# Patient Record
Sex: MALE | Race: BLACK OR AFRICAN AMERICAN | Employment: OTHER | URBAN - METROPOLITAN AREA
[De-identification: names, ages, dates, MRNs, and addresses within clinical notes are randomized per-mention and may not be internally consistent; named-entity substitution may affect disease eponyms.]

---

## 2017-08-01 ENCOUNTER — HOSPICE ADMISSION (OUTPATIENT)
Dept: HOSPICE | Age: 77
End: 2017-08-01
Payer: OTHER MISCELLANEOUS

## 2017-08-01 ENCOUNTER — HOSPICE ADMISSION (OUTPATIENT)
Dept: HOSPICE | Facility: HOSPICE | Age: 77
End: 2017-08-01

## 2017-08-02 ENCOUNTER — HOSPITAL ENCOUNTER (INPATIENT)
Age: 77
LOS: 5 days | Discharge: HOSPICE | End: 2017-08-07
Attending: INTERNAL MEDICINE | Admitting: INTERNAL MEDICINE

## 2017-08-02 PROCEDURE — 74011250637 HC RX REV CODE- 250/637: Performed by: INTERNAL MEDICINE

## 2017-08-02 PROCEDURE — 0655 HSPC INPATIENT RESPITE

## 2017-08-02 PROCEDURE — 3336500001 HSPC ELECTION

## 2017-08-02 RX ORDER — MILRINONE LACTATE 0.2 MG/ML
0.38 INJECTION, SOLUTION INTRAVENOUS CONTINUOUS
Status: DISCONTINUED | OUTPATIENT
Start: 2017-08-04 | End: 2017-08-07 | Stop reason: HOSPADM

## 2017-08-02 RX ORDER — AMIODARONE HYDROCHLORIDE 200 MG/1
200 TABLET ORAL DAILY
Status: DISCONTINUED | OUTPATIENT
Start: 2017-08-03 | End: 2017-08-07 | Stop reason: HOSPADM

## 2017-08-02 RX ORDER — LISINOPRIL 5 MG/1
5 TABLET ORAL DAILY
Status: DISCONTINUED | OUTPATIENT
Start: 2017-08-03 | End: 2017-08-07 | Stop reason: HOSPADM

## 2017-08-02 RX ORDER — POTASSIUM CHLORIDE 20 MEQ/1
40 TABLET, EXTENDED RELEASE ORAL DAILY
Status: DISCONTINUED | OUTPATIENT
Start: 2017-08-03 | End: 2017-08-07 | Stop reason: HOSPADM

## 2017-08-02 RX ORDER — MILRINONE LACTATE 0.2 MG/ML
0.38 INJECTION, SOLUTION INTRAVENOUS CONTINUOUS
Status: DISCONTINUED | OUTPATIENT
Start: 2017-08-02 | End: 2017-08-02

## 2017-08-02 RX ORDER — CLOPIDOGREL BISULFATE 75 MG/1
75 TABLET ORAL DAILY
Status: DISCONTINUED | OUTPATIENT
Start: 2017-08-03 | End: 2017-08-07 | Stop reason: HOSPADM

## 2017-08-02 RX ORDER — ATORVASTATIN CALCIUM 40 MG/1
40 TABLET, FILM COATED ORAL
Status: DISCONTINUED | OUTPATIENT
Start: 2017-08-02 | End: 2017-08-07 | Stop reason: HOSPADM

## 2017-08-02 RX ORDER — BUPROPION HYDROCHLORIDE 100 MG/1
100 TABLET ORAL
Status: DISCONTINUED | OUTPATIENT
Start: 2017-08-02 | End: 2017-08-07 | Stop reason: HOSPADM

## 2017-08-02 RX ORDER — FACIAL-BODY WIPES
10 EACH TOPICAL DAILY PRN
Status: DISCONTINUED | OUTPATIENT
Start: 2017-08-02 | End: 2017-08-07 | Stop reason: HOSPADM

## 2017-08-02 RX ORDER — SILVER SULFADIAZINE 10 G/1000G
CREAM TOPICAL
Status: DISCONTINUED | OUTPATIENT
Start: 2017-08-02 | End: 2017-08-04

## 2017-08-02 RX ORDER — HYOSCYAMINE SULFATE 0.12 MG/1
0.12 TABLET SUBLINGUAL
Status: DISCONTINUED | OUTPATIENT
Start: 2017-08-02 | End: 2017-08-02 | Stop reason: SDUPTHER

## 2017-08-02 RX ORDER — LORAZEPAM 2 MG/ML
0.5 CONCENTRATE ORAL
Status: DISCONTINUED | OUTPATIENT
Start: 2017-08-02 | End: 2017-08-07 | Stop reason: HOSPADM

## 2017-08-02 RX ORDER — CARVEDILOL 6.25 MG/1
6.25 TABLET ORAL 2 TIMES DAILY WITH MEALS
Status: DISCONTINUED | OUTPATIENT
Start: 2017-08-02 | End: 2017-08-07 | Stop reason: HOSPADM

## 2017-08-02 RX ORDER — HYOSCYAMINE SULFATE 0.12 MG/ML
125 LIQUID ORAL
Status: DISCONTINUED | OUTPATIENT
Start: 2017-08-02 | End: 2017-08-07 | Stop reason: HOSPADM

## 2017-08-02 RX ORDER — BUMETANIDE 1 MG/1
1 TABLET ORAL 2 TIMES DAILY
Status: DISCONTINUED | OUTPATIENT
Start: 2017-08-02 | End: 2017-08-07 | Stop reason: HOSPADM

## 2017-08-02 RX ORDER — HYOSCYAMINE SULFATE 0.12 MG/ML
250 LIQUID ORAL
Status: DISCONTINUED | OUTPATIENT
Start: 2017-08-02 | End: 2017-08-07 | Stop reason: HOSPADM

## 2017-08-02 RX ORDER — METFORMIN HYDROCHLORIDE 500 MG/1
500 TABLET ORAL 2 TIMES DAILY WITH MEALS
Status: DISCONTINUED | OUTPATIENT
Start: 2017-08-02 | End: 2017-08-02 | Stop reason: ALTCHOICE

## 2017-08-02 RX ORDER — MORPHINE SULFATE 20 MG/ML
5 SOLUTION ORAL
Status: DISCONTINUED | OUTPATIENT
Start: 2017-08-02 | End: 2017-08-07 | Stop reason: HOSPADM

## 2017-08-02 RX ORDER — ACETAMINOPHEN 650 MG/1
650 SUPPOSITORY RECTAL
Status: DISCONTINUED | OUTPATIENT
Start: 2017-08-02 | End: 2017-08-07 | Stop reason: HOSPADM

## 2017-08-02 RX ORDER — PANTOPRAZOLE SODIUM 40 MG/1
40 TABLET, DELAYED RELEASE ORAL
Status: DISCONTINUED | OUTPATIENT
Start: 2017-08-03 | End: 2017-08-07 | Stop reason: HOSPADM

## 2017-08-02 RX ORDER — SILVER SULFADIAZINE 10 G/1000G
CREAM TOPICAL
Status: DISCONTINUED | OUTPATIENT
Start: 2017-08-02 | End: 2017-08-02

## 2017-08-02 RX ORDER — HYDRALAZINE HYDROCHLORIDE 50 MG/1
50 TABLET, FILM COATED ORAL 3 TIMES DAILY
Status: DISCONTINUED | OUTPATIENT
Start: 2017-08-02 | End: 2017-08-07 | Stop reason: HOSPADM

## 2017-08-02 RX ORDER — HALOPERIDOL 2 MG/ML
1 SOLUTION ORAL
Status: DISCONTINUED | OUTPATIENT
Start: 2017-08-02 | End: 2017-08-07 | Stop reason: HOSPADM

## 2017-08-02 RX ORDER — INSULIN GLARGINE 100 [IU]/ML
14 INJECTION, SOLUTION SUBCUTANEOUS 2 TIMES DAILY
Status: DISCONTINUED | OUTPATIENT
Start: 2017-08-02 | End: 2017-08-07 | Stop reason: HOSPADM

## 2017-08-02 RX ADMIN — ATORVASTATIN CALCIUM 40 MG: 40 TABLET, FILM COATED ORAL at 21:38

## 2017-08-02 RX ADMIN — HYDRALAZINE HYDROCHLORIDE 50 MG: 50 TABLET, FILM COATED ORAL at 15:34

## 2017-08-02 RX ADMIN — CARVEDILOL 6.25 MG: 6.25 TABLET ORAL at 20:07

## 2017-08-02 RX ADMIN — BUPROPION HYDROCHLORIDE 100 MG: 100 TABLET ORAL at 21:38

## 2017-08-02 RX ADMIN — INSULIN GLARGINE 14 UNITS: 100 INJECTION, SOLUTION SUBCUTANEOUS at 20:05

## 2017-08-02 RX ADMIN — BUMETANIDE 1 MG: 1 TABLET ORAL at 20:07

## 2017-08-02 RX ADMIN — HYDRALAZINE HYDROCHLORIDE 50 MG: 50 TABLET, FILM COATED ORAL at 20:08

## 2017-08-02 NOTE — PROGRESS NOTES
1400 Patient sitting up in bed watching television. 1600 Patient sitting up in bed talking on the telephone. Patient denying pain discomfort. 1800 Patient consuming dinner.

## 2017-08-02 NOTE — PROGRESS NOTES
NAME OF PATIENT:  Anna Mendez    LEVEL OF CARE:  Respite    REASON FOR GIP:   n/a    *PATIENT REMAINS ELIGIBLE FOR GIP LEVEL OF CARE AS EVIDENCED BY: (MUST BE ADDRESSED OF PATIENT GIP)      REASON FOR RESPITE:  Caregiver breakdown    O2 SAFETY:  room air    FALL INTERVENTIONS PROVIDED:   Implemented/recommended use of non-skid footwear, Implemented/recommended use of fall risk identification flag to all team members, Implemented/recommended assistive devices and encouraged their use, Implemented/recommended resources for alarm system (personal alarm, bed alarm, call bell, etc.) , Implemented/recommended environmental changes (remove hazards, lower bed, improve lighting, etc.) and Implemented/recommended increased supervision/assistance    INTERDISPLINARY COMMUNICATION/COLLABORATION:  Physician, MSW, [place] and RN, CNA    NEW MEDICATION INITIATION DOCUMENTATION:  Documentation completed in Clinical Note in [facility]    Reason medication is being initiated:  n/a    MD / Provider name consulted re: change in status / initiation of new medication:  n/a    New Symptom(s):  n/a    New Order(s):  n/a    Name of the person notified of the changes:  n/a    Name of person being taught:  n/a    Instructions given:  n/a    Side Effects taught:  n/a    Response to teaching:  n/a      COMFORTABLE DYING MEASURE:  Is Patient/family satisfied with symptom level?  yes    DISCHARGE PLAN:  Discharge home after respite stay. 1213: Patient arrived via [facility] to room 13 from home. Patient is under [facility] for respite level of care with a dx of CHF. Patient is AOx3 and very pleasant. Wife is at the bedside. Patient lungs are clear. Milrinone gtt infusing and is being managed by [facility] and [facility]. Last BM was 8-1-17. Patient walks with walker due to recent falls. Noted scattered bruising and sores on arms, legs due to eczema. Tolerates a reg dieta and is continent of bowel and bladder.  PICC line upper arm.

## 2017-08-02 NOTE — PROGRESS NOTES
1900: Shift report received from Landen montague RN.  2000: Pt in bed, alert and oriented, talking on the phone. Pt reports no pain, no discomfort and answers all questions appropriately. BP elevated at 180/80, other VS stable. Pts blood glucose is 126. He is being medicated with scheduled meds and injected with 14 units of Lantus per order. Pt states his wound care on his legs was done this morning before admission by his home nurse and is due next on Friday. Pt asks for a cookie and he was given a chocolate chip cookies that the volunteers had brought in for the patients and families today. He states he eats like that at home as well. 2150: Pt in bed, waiting to be medicated with scheduled meds. Requests to ambulate with walker. RN asked pt if he is able to walk with assistance of walker without risk of falling and pt states he would be fine. Pt ambulated to family room, accompanied by RN. RN heats up food for pt upon request. He sits in chair and eats in, then ambulates back to his room. Pt then proceeds to walk in the bathroom to brush his teeth, RN attempts to grab a chair for pt to sit at the sink. Pt does not verbalize any issues but then falls backwards on his bottom and slightly graces door with his head. RN helps pt up and he has no injuries from the fall and reports no significant pain. Pt states the reason for the fall is loss in stability/strenghts in his feet and that this was something that happens to him intermittently, about a few times a week. He did not mention this prior to ambulating. RN is doing a [employer] report. 0320: Pt in bed, calm and awake, no complains. Bed alarm on.  0330: Pt calls to be assisted to the bathroom. 0400: Pt wants to brush his teeth, he is doing so sitting on the side of the bed. Pts wounds have saturated the socks, so dressing was assessed and determined that it needed to be changed. Extensive wounds care done to both legs.  Pt has multiple wounds of different sizes on his feet/toes up to his knee. Wound cleaned with wound cleanser. Pts verito paste applied, dressed with gauze and bandaged. Pt tolerated this well. 0730: [facility] hosp[facility] and pts wife to report pts fall. Post Fall Documentation      Benjamin Caba witnessed fall occurred on 8-2-17 (Date) at 2215 (Time). The answers to the following questions summarize the fall:     · In the patient's own words,:  · What was he/she doing when he/she fell? Waiting to ambulate to the bathroom    · What are his/her complaints? Lost strength in feet    · Nurse:  · Document observation, treatment, conversation, follow-up, and patient response. · Pt feel backwards, landing on his bottom, fall happened somewhat slow, so impact was not that great. Pt lightly bumped in to the door with his head. · What was the patient's condition when found (i.e., pain, symptoms, cuts, bruises)? No symptoms, no pain reported by patient    · What specific complaints did the patient have? None    · What did the staff do when patient was found (i.e., vital signs, returned to bed with fall alarm, side rails up)? Helped pt stand up, assessed skin for any injuries, asked pt if he was hurting anywhere, assisted him to lay down, 2 side rails up, bed alarm on, call button in reach, reminded to call when wanting to ambulate. · Which physician was notified? No Physician called because pt displays/states no symptoms or injuries, MD will be notified via this note and on day shift by staff.     Dulce Espinal

## 2017-08-02 NOTE — H&P
[facility]  [phone]    Patient Name: Nikita Echeverria  YOB: 1940    Date of Provider Hospice Visit: 08/02/17    Level of Care:   [] General Inpatient (GIP)    [] Routine   [x] Respite    Location of Care:  [] [facility] [place] [] [facility] [] [facility] [] [facility] [x] [facility]    Date of Original Hospice Admission: [facility] Director for Inpatient Care: Todd    Principle Hospice Diagnosis: end stage heart disease, heart failure        HOSPICE SUMMARY       Nikita Echeverria is a 76y.o. year old who was admitted to [facility] for respite care for caregiver breakdown, spouse is exhausted providing care. Hx of recent falls, weakness, debility. Pt is on Milrinone for end stage cardiac disease. Functionally, the patient's Karnofsky and/or Palliative Performance Scale has declined over a period of weeks and is estimated at 40%. The patient is dependent on the following ADLs:         The patient/family chose comfort measures with the support of Hospice. HOSPICE DIAGNOSES   Active Symptoms:  1. Weakness  2. Shortness of breath  3. Lower extremity wounds  4. History of falls     PLAN   Admitted for respite care, lorazepam, Levsin and morphine as needed  1.  and SW to support family needs  2. Disposition: home after respite  3. meds as per [facility]  4. Continue milrinone drip as per [facility]    Prognosis estimated based on 08/02/17 clinical assessment is:   [] Hours to Days    [x] Days to Weeks    [] Other:    Communicated plan of care with: Hospice Case Manager; Hospice IDT; Care Team     GOALS OF CARE     Resuscitation Status: DNR  Durable DNR: [x] Yes [] No    No flowsheet data found.      HISTORY     History obtained from: chart, SN and pt    CHIEF COMPLAINT: weakness  The patient is:   [x] Verbal  [] Nonverbal  [] Unresponsive    HPI/SUBJECTIVE:  76year old male with hx of end stage heart failure       REVIEW OF SYSTEMS     The following systems were: [x] reviewed  [] unable to be reviewed    Positive ROS include:  Constitutional:  Ears/nose/mouth/throat:  Respiratory:  Gastrointestinal:  Musculoskeletal:  Neurologic:  Psychiatric:  Endocrine:     Adult Non-Verbal Pain Assessment Score: denies    Face  [x] 0   No particular expression or smile  [] 1   Occasional grimace, tearing, frowning, wrinkled forehead  [] 2   Frequent grimace, tearing, frowning, wrinkled forehead    Activity (movement)  [x] 0   Lying quietly, normal position  [] 1   Seeking attention through movement or slow, cautious movement  [] 2   Restless, excessive activity and/or withdrawal reflexes    Guarding  [x] 0   Lying quietly, no positioning of hands over areas of body  [] 1   Splinting areas of the body, tense  [] 2   Rigid, stiff    Physiology (vital signs)  [x] 0   Stable vital signs  [] 1   Change in any of the following: SBP > 20mm Hg; HR > 20/minute  [] 2   Change in any of the following: SBP > 30mm Hg; HR > 25/minute    Respiratory  [x] 0   Baseline RR/SpO2, compliant with ventilator  [] 1   RR > 10 above baseline, or 5% drop SpO2, mild asynchrony with ventilator  [] 2   RR > 20 above baseline, or 10% drop SpO2, asynchrony with ventilator     FUNCTIONAL ASSESSMENT     Palliative Performance Scale (PPS):      PSYCHOSOCIAL/SPIRITUAL ASSESSMENT     Active Problems:    * No active hospital problems. *    No past medical history on file. No past surgical history on file. Social History   Substance Use Topics    Smoking status: Not on file    Smokeless tobacco: Not on file    Alcohol use Not on file     No family history on file.    No Known Allergies   Current Facility-Administered Medications   Medication Dose Route Frequency    morphine (ROXANOL) 100 mg/5 mL (20 mg/mL) concentrated solution 5 mg  5 mg SubLINGual Q4H PRN    LORazepam (INTENSOL) 2 mg/mL oral concentrate 0.5 mg  0.5 mg SubLINGual Q4H PRN    haloperidol (HALDOL) 2 mg/mL oral solution 1 mg  1 mg Oral Q6H PRN    bisacodyl (DULCOLAX) suppository 10 mg  10 mg Rectal DAILY PRN    acetaminophen (TYLENOL) suppository 650 mg  650 mg Rectal Q4H PRN    apixaban (ELIQUIS) tablet 5 mg  5 mg Oral BID    [START ON 8/3/2017] clopidogrel (PLAVIX) tablet 75 mg  75 mg Oral DAILY    [START ON 8/3/2017] lisinopril (PRINIVIL, ZESTRIL) tablet 5 mg  5 mg Oral DAILY    carvedilol (COREG) tablet 6.25 mg  6.25 mg Oral BID WITH MEALS    [START ON 8/3/2017] potassium chloride (K-DUR, KLOR-CON) SR tablet 40 mEq  40 mEq Oral DAILY    [START ON 8/3/2017] amiodarone (CORDARONE) tablet 200 mg  200 mg Oral DAILY    atorvastatin (LIPITOR) tablet 40 mg  40 mg Oral QHS    hydrALAZINE (APRESOLINE) tablet 50 mg  50 mg Oral TID    insulin glargine (LANTUS) injection 14 Units  14 Units SubCUTAneous BID    buPROPion (WELLBUTRIN) tablet 100 mg  100 mg Oral QHS    hyoscyamine (LEVSIN) 0.125mg/mL solution 125 mcg  125 mcg Oral Q4H PRN    hyoscyamine (LEVSIN) 0.125mg/mL solution 250 mcg  250 mcg Oral Q4H PRN    bumetanide (BUMEX) tablet 1 mg  1 mg Oral BID    [START ON 8/3/2017] pantoprazole (PROTONIX) tablet 40 mg  40 mg Oral ACB    milrinone (PRIMACOR) 40 MG/200 ML D5W infusion  0.382 mcg/kg/min (Order-Specific) IntraVENous CONTINUOUS    silver sulfADIAZINE (SILVADENE) 1 % topical cream   Topical Q MON, WED & FRI        PHYSICAL EXAM     Wt Readings from Last 3 Encounters:   No data found for Wt       Visit Vitals    /56 (BP 1 Location: Right arm, BP Patient Position: At rest)    Pulse 62    Temp 98.1 °F (36.7 °C)    Resp 20    SpO2 97%       Supplemental O2  [x] Yes  [] NO  Last bowel movement:     Currently this patient has:  [] Peripheral IV [x] PICC  [] PORT [] ICD    [] Day Catheter [] NG Tube   [] PEG Tube    [] Rectal Tube [] Drain  [] Other:     Constitutional: awake alert verbal  Eyes: cl  ENMT: cl  Cardiovascular: HR luis  Respiratory: cl  Gastrointestinal: snt  Musculoskeletal: weakness  Skin: warm, dry, pale  Neurologic: calm  Psychiatric:   Other:    Pertinent Lab and or Imaging Tests:  No results found for: NA, K, CL, CO2, AGAP, GLU, BUN, CREA, BUCR, GFRAA, GFRNA, CA, GFRAA  No results found for: TP, ALBR, TALB, ALB        Total time: 55  Counseling / coordination time: 45  > 50% counseling / coordination?: y  Haseeb Madrid NP

## 2017-08-03 PROCEDURE — 0655 HSPC INPATIENT RESPITE

## 2017-08-03 PROCEDURE — 74011250637 HC RX REV CODE- 250/637: Performed by: INTERNAL MEDICINE

## 2017-08-03 RX ADMIN — CLOPIDOGREL BISULFATE 75 MG: 75 TABLET ORAL at 08:22

## 2017-08-03 RX ADMIN — BUMETANIDE 1 MG: 1 TABLET ORAL at 20:00

## 2017-08-03 RX ADMIN — BUMETANIDE 1 MG: 1 TABLET ORAL at 08:22

## 2017-08-03 RX ADMIN — LISINOPRIL 5 MG: 5 TABLET ORAL at 12:28

## 2017-08-03 RX ADMIN — BUPROPION HYDROCHLORIDE 100 MG: 100 TABLET ORAL at 22:00

## 2017-08-03 RX ADMIN — AMIODARONE HYDROCHLORIDE 200 MG: 200 TABLET ORAL at 08:21

## 2017-08-03 RX ADMIN — PANTOPRAZOLE SODIUM 40 MG: 40 TABLET, DELAYED RELEASE ORAL at 08:21

## 2017-08-03 RX ADMIN — HYDRALAZINE HYDROCHLORIDE 50 MG: 50 TABLET, FILM COATED ORAL at 20:00

## 2017-08-03 RX ADMIN — CARVEDILOL 6.25 MG: 6.25 TABLET ORAL at 20:00

## 2017-08-03 RX ADMIN — INSULIN GLARGINE 14 UNITS: 100 INJECTION, SOLUTION SUBCUTANEOUS at 20:00

## 2017-08-03 RX ADMIN — CARVEDILOL 6.25 MG: 6.25 TABLET ORAL at 08:21

## 2017-08-03 RX ADMIN — HYDRALAZINE HYDROCHLORIDE 50 MG: 50 TABLET, FILM COATED ORAL at 15:36

## 2017-08-03 RX ADMIN — ATORVASTATIN CALCIUM 40 MG: 40 TABLET, FILM COATED ORAL at 22:00

## 2017-08-03 RX ADMIN — INSULIN GLARGINE 14 UNITS: 100 INJECTION, SOLUTION SUBCUTANEOUS at 08:22

## 2017-08-03 RX ADMIN — HYDRALAZINE HYDROCHLORIDE 50 MG: 50 TABLET, FILM COATED ORAL at 08:21

## 2017-08-03 RX ADMIN — POTASSIUM CHLORIDE 40 MEQ: 20 TABLET, EXTENDED RELEASE ORAL at 08:21

## 2017-08-03 NOTE — PROGRESS NOTES
Problem: Falls - Risk of  Goal: *Absence of falls  Outcome: Not Progressing Towards Goal  Patient fell during the night, no injury reported. Patient states felt weak in the knees.

## 2017-08-03 NOTE — PROGRESS NOTES
NAME OF PATIENT:  Kim Mendez    LEVEL OF CARE:  Respite    REASON FOR GIP:   N/A    PATIENT REMAINS ELIGIBLE FOR GIP LEVEL OF CARE AS EVIDENCED BY: (MUST BE ADDRESSED OF PATIENT GIP) n/a      REASON FOR RESPITE:  Caregiver breakdown    O2 SAFETY:  Concentrator positioning (6\" from furniture/drapes), Tanks stored in mora , No petroleum based products on face while oxygen in use and Oxygen sign on the door    FALL INTERVENTIONS PROVIDED:   Implemented/recommended use of non-skid footwear, Implemented/recommended use of fall risk identification flag to all team members, Implemented/recommended assistive devices and encouraged their use, Implemented/recommended resources for alarm system (personal alarm, bed alarm, call bell, etc.)  and Implemented/recommended environmental changes (remove hazards, lower bed, improve lighting, etc.)    INTERDISPLINARY COMMUNICATION/COLLABORATION:  Physician, MSW, [place] and RN, CNA    NEW MEDICATION INITIATION DOCUMENTATION:  N/A    Reason medication is being initiated:  n/a    MD / Provider name consulted re: change in status / initiation of new medication:  n/a    New Symptom(s):  n/a  New Order(s):  n/a    Name of the person notified of the changes: n/a  Name of person being taught: n/a  Instructions given: n/a    Side Effects taught:  n/a    Response to teaching:  n/a    COMFORTABLE DYING MEASURE:  Is Patient/family satisfied with symptom level?   Yes    DISCHARGE PLAN:  Will return home with wife when respite ends

## 2017-08-03 NOTE — HOSPICE
Bedside shift change report given to [address] (oncoming nurse) by Fermin Staley RN (offgoing nurse). Report included the following information SBAR, Kardex and MAR.

## 2017-08-03 NOTE — HOSPICE
0700 Received patient from [facility], patient resting comfortably in bed. Alert and oriented, watching tv.   3061 Took patient's vitals, patient is alert and oriented x3, noted that he had a fall last night and stated that he knew to ring out if he intends to get up. Patient's vitals are stable, patient has a PICC line in left arm with milrinone drip taken care of by [facility]. Wound care was completed this morning, all dressings clean dry and intact. Emptied urinal for 800ml of clear yellow urine. 0800 Patient's blood sugar check was 114, patient to receive 14 units of lantus, states that he tries to follow a well balanced diet and does his own injections at home in his abdomen. 8112 Patient received morning medications with no complaints of pain, no difficulty in swallowing, lungs clear, hyperactive bowel sounds in all four quadrants, dressings intact, PICC line patent with milrinone drip, up with assist, received 14 units of lantus via abdomen, eagerly awaiting breakfast.   1000 patient ate breakfast, resting on sofa in room, fully dressed can dress himself with standby assist.   1200 gave patient lisinopril, /46. Wife is visiting and brought his milrinone refill, It is in the fridge in med room. 1400 patient sitting with wife on sofa. 1500 gave patient hydralazine, /70, pulse 67.   1700 Patient ambulating in hallway with walker, walked patient back to room. He is looking to speak with his  Kortney. Patient left message for her.

## 2017-08-04 PROCEDURE — 74011250636 HC RX REV CODE- 250/636: Performed by: NURSE PRACTITIONER

## 2017-08-04 PROCEDURE — 0655 HSPC INPATIENT RESPITE

## 2017-08-04 PROCEDURE — 74011250637 HC RX REV CODE- 250/637: Performed by: INTERNAL MEDICINE

## 2017-08-04 RX ORDER — SILVER SULFADIAZINE 10 G/1000G
CREAM TOPICAL
Status: DISCONTINUED | OUTPATIENT
Start: 2017-08-06 | End: 2017-08-07 | Stop reason: HOSPADM

## 2017-08-04 RX ADMIN — HYDRALAZINE HYDROCHLORIDE 50 MG: 50 TABLET, FILM COATED ORAL at 15:37

## 2017-08-04 RX ADMIN — HYDRALAZINE HYDROCHLORIDE 50 MG: 50 TABLET, FILM COATED ORAL at 09:56

## 2017-08-04 RX ADMIN — INSULIN GLARGINE 14 UNITS: 100 INJECTION, SOLUTION SUBCUTANEOUS at 21:06

## 2017-08-04 RX ADMIN — PANTOPRAZOLE SODIUM 40 MG: 40 TABLET, DELAYED RELEASE ORAL at 09:56

## 2017-08-04 RX ADMIN — CLOPIDOGREL BISULFATE 75 MG: 75 TABLET ORAL at 09:53

## 2017-08-04 RX ADMIN — CARVEDILOL 6.25 MG: 6.25 TABLET ORAL at 20:52

## 2017-08-04 RX ADMIN — ATORVASTATIN CALCIUM 40 MG: 40 TABLET, FILM COATED ORAL at 20:50

## 2017-08-04 RX ADMIN — BUPROPION HYDROCHLORIDE 100 MG: 100 TABLET ORAL at 20:51

## 2017-08-04 RX ADMIN — BUMETANIDE 1 MG: 1 TABLET ORAL at 20:51

## 2017-08-04 RX ADMIN — CARVEDILOL 6.25 MG: 6.25 TABLET ORAL at 09:55

## 2017-08-04 RX ADMIN — MILRINONE LACTATE 0.38 MCG/KG/MIN: 200 INJECTION, SOLUTION INTRAVENOUS at 09:00

## 2017-08-04 RX ADMIN — HYDRALAZINE HYDROCHLORIDE 50 MG: 50 TABLET, FILM COATED ORAL at 20:52

## 2017-08-04 RX ADMIN — AMIODARONE HYDROCHLORIDE 200 MG: 200 TABLET ORAL at 09:57

## 2017-08-04 RX ADMIN — POTASSIUM CHLORIDE 40 MEQ: 20 TABLET, EXTENDED RELEASE ORAL at 09:54

## 2017-08-04 RX ADMIN — LISINOPRIL 5 MG: 5 TABLET ORAL at 15:37

## 2017-08-04 RX ADMIN — BUMETANIDE 1 MG: 1 TABLET ORAL at 09:54

## 2017-08-04 RX ADMIN — INSULIN GLARGINE 14 UNITS: 100 INJECTION, SOLUTION SUBCUTANEOUS at 09:58

## 2017-08-04 NOTE — PROGRESS NOTES
0700:  Verbal shift change report given to Sandhya Berg RN (oncoming nurse) by Leatha Swain RN (offgoing nurse). Report included the following information SBAR, Kardex, Intake/Output and MAR.   1000:  Administered scheduled medications (see MAR) and assessed patient; LS clear; no s/s of distress; patient denies pain. 1815:  Patient transferred to [address] room #8 d/t staffing for the evening. Report was given to [facility], LPN.     NAME OF PATIENT:  Marcela Mendez    LEVEL OF CARE:  Respite    REASON FOR GIP:   n/a    *PATIENT REMAINS ELIGIBLE FOR [facility] LEVEL OF CARE AS EVIDENCED BY: (MUST BE ADDRESSED OF PATIENT GIP)      REASON FOR RESPITE:  Caregiver breakdown    O2 SAFETY:  Concentrator positioning (6\" from furniture/drapes), Tanks stored in mora , No petroleum based products on face while oxygen in use and Oxygen sign on the door    FALL INTERVENTIONS PROVIDED:   Implemented/recommended use of non-skid footwear, Implemented/recommended use of fall risk identification flag to all team members, Implemented/recommended assistive devices and encouraged their use, Implemented/recommended resources for alarm system (personal alarm, bed alarm, call bell, etc.) , Implemented/recommended environmental changes (remove hazards, lower bed, improve lighting, etc.) and Implemented/recommended increased supervision/assistance    INTERDISPLINARY COMMUNICATION/COLLABORATION:  Physician, MSW, [place] and RN, CNA    NEW MEDICATION INITIATION DOCUMENTATION:  n/a    Reason medication is being initiated:  n/a    MD / Provider name consulted re: change in status / initiation of new medication:  n/a    New Symptom(s):  n/a    New Order(s):  n/a    Name of the person notified of the changes:  n/a    Name of person being taught:  n/a    Instructions given:  n/a    Side Effects taught:  n/a    Response to teaching:  n/a      COMFORTABLE DYING MEASURE:  Is Patient/family satisfied with symptom level?  yes    DISCHARGE PLAN:  Once patient has complete respite, he will go home with his wife and followed by [facility].

## 2017-08-04 NOTE — PROGRESS NOTES
1815: Received patient into room 8 as transfer from [address] [place] [address]. He is respite level of care and is ambulatory with standby assist. Pt presently sitting in recliner at bedside watching tv. Call bell in reach Will continue to observe.

## 2017-08-04 NOTE — HOSPICE
NAME OF PATIENT:  Anna Mendez    LEVEL OF CARE:  Respite    REASON FOR GIP:       *PATIENT REMAINS ELIGIBLE FOR GIP LEVEL OF CARE AS EVIDENCED BY: (MUST BE ADDRESSED OF PATIENT GIP)      REASON FOR RESPITE:  Caregiver breakdown    O2 SAFETY:      FALL INTERVENTIONS PROVIDED:   Implemented/recommended use of non-skid footwear, Implemented/recommended use of fall risk identification flag to all team members, Implemented/recommended assistive devices and encouraged their use, Implemented/recommended resources for alarm system (personal alarm, bed alarm, call bell, etc.)  and Implemented/recommended environmental changes (remove hazards, lower bed, improve lighting, etc.)    INTERDISPLINARY COMMUNICATION/COLLABORATION:  Physician, MSW, [place] and RN, CNA    NEW MEDICATION INITIATION DOCUMENTATION:    Reason medication is being initiated:      MD / Provider name consulted re: change in status / initiation of new medication:      New Symptom(s):      New Order(s):      Name of the person notified of the changes:      Name of person being taught:      Instructions given:      Side Effects taught:      Response to teachin [address] free  Is Patient/family satisfied with symptom level?  yes    DISCHARGE PLAN:  Return home with wife and be followed by home hospice    20:00,report received,assumed care of pt who is Respite care,hospice dx is CHF. Pt was seen coming back from BR w/o use of walker. gait appeared steady,but I encouraged pt to call me when needing to be OOB,due to fall last night. Bed alarm was activated. CADD pump with Primacor gtt infusing via [address] @ 8.8ml/hr. Pt denies any c/o discomfort. Pt has drsg to BLE,noted \"sores \"also on arms,one was weeping. 22:00,Bed alarm ringing,Found pt up in room,changing his Pajamas. Pt was assisted in this activity,but I also reminded him that he needs to call me before getting up. He voices understanding. Bed alarm reactivated. \"  04:00,no changes,has voided 1400cc this shift.

## 2017-08-05 PROCEDURE — 74011250637 HC RX REV CODE- 250/637: Performed by: INTERNAL MEDICINE

## 2017-08-05 PROCEDURE — 74011250636 HC RX REV CODE- 250/636: Performed by: NURSE PRACTITIONER

## 2017-08-05 PROCEDURE — 0655 HSPC INPATIENT RESPITE

## 2017-08-05 RX ADMIN — CARVEDILOL 6.25 MG: 6.25 TABLET ORAL at 20:18

## 2017-08-05 RX ADMIN — CARVEDILOL 6.25 MG: 6.25 TABLET ORAL at 08:41

## 2017-08-05 RX ADMIN — PANTOPRAZOLE SODIUM 40 MG: 40 TABLET, DELAYED RELEASE ORAL at 08:41

## 2017-08-05 RX ADMIN — MILRINONE LACTATE 0.38 MCG/KG/MIN: 200 INJECTION, SOLUTION INTRAVENOUS at 20:36

## 2017-08-05 RX ADMIN — HYDRALAZINE HYDROCHLORIDE 50 MG: 50 TABLET, FILM COATED ORAL at 08:43

## 2017-08-05 RX ADMIN — HYDRALAZINE HYDROCHLORIDE 50 MG: 50 TABLET, FILM COATED ORAL at 20:17

## 2017-08-05 RX ADMIN — CLOPIDOGREL BISULFATE 75 MG: 75 TABLET ORAL at 08:41

## 2017-08-05 RX ADMIN — POTASSIUM CHLORIDE 40 MEQ: 20 TABLET, EXTENDED RELEASE ORAL at 08:41

## 2017-08-05 RX ADMIN — AMIODARONE HYDROCHLORIDE 200 MG: 200 TABLET ORAL at 08:41

## 2017-08-05 RX ADMIN — INSULIN GLARGINE 14 UNITS: 100 INJECTION, SOLUTION SUBCUTANEOUS at 20:19

## 2017-08-05 RX ADMIN — ATORVASTATIN CALCIUM 40 MG: 40 TABLET, FILM COATED ORAL at 22:00

## 2017-08-05 RX ADMIN — LISINOPRIL 5 MG: 5 TABLET ORAL at 13:21

## 2017-08-05 RX ADMIN — BUMETANIDE 1 MG: 1 TABLET ORAL at 08:41

## 2017-08-05 RX ADMIN — BUPROPION HYDROCHLORIDE 100 MG: 100 TABLET ORAL at 22:00

## 2017-08-05 RX ADMIN — BUMETANIDE 1 MG: 1 TABLET ORAL at 20:18

## 2017-08-05 RX ADMIN — INSULIN GLARGINE 14 UNITS: 100 INJECTION, SOLUTION SUBCUTANEOUS at 08:43

## 2017-08-05 RX ADMIN — HYDRALAZINE HYDROCHLORIDE 50 MG: 50 TABLET, FILM COATED ORAL at 14:00

## 2017-08-05 NOTE — PROGRESS NOTES
0700:Report received from [facility], pt is awake alert and oriented times 4, denies pain, no complaints or discomfort noted, continue to monitor. 0900: Adm am meds tolerated well,   1100:CNA gave a full shower, dressing to legs done per MD orders. 1300:Transfer to chair pt ate 100% of his meal.  1500:Pt family visiting, no complaints. 1700:Pt uses the urinal throughout the shift. Voids clear yellow urine out  1900:Report given to RN. NAME OF PATIENT:  Jennifer Mendez    LEVEL OF CARE: Respite  REASON FOR GIP: N/A      *PATIENT REMAINS ELIGIBLE FOR [facility] LEVEL OF CARE AS EVIDENCED BY: (MUST BE ADDRESSED OF PATIENT GIP)      REASON FOR RESPITE:  Caregiver breakdown    O2 SAFETY:  Concentrator positioning (6\" from furniture/drapes), Tanks stored in mora , No petroleum based products on face while oxygen in use and Oxygen sign on the door    FALL INTERVENTIONS PROVIDED:   Implemented/recommended use of fall risk identification flag to all team members, Implemented/recommended assistive devices and encouraged their use, Implemented/recommended resources for alarm system (personal alarm, bed alarm, call bell, etc.)  and Implemented/recommended environmental changes (remove hazards, lower bed, improve lighting, etc.)    INTERDISPLINARY COMMUNICATION/COLLABORATION:  Physician, MSW, [place] and RN, CNA    NEW MEDICATION INITIATION DOCUMENTATION:N/A      Reason medication is being initiated:  N/A    MD / Provider name consulted re: change in status / initiation of new medication:  N/A    New Symptom(s):  N/A    New Order(s):  N/A    Name of the person notified of the changes:  N/A    Name of person being taught:  N/A    Instructions given:  N/A    Side Effects taught:  N/A    Response to teaching:  N/A      COMFORTABLE DYING MEASURE:  Is Patient/family satisfied with symptom level?  yes    DISCHARGE PLAN:  Pt will complete 5 day respite and go home with family.

## 2017-08-06 PROCEDURE — 0655 HSPC INPATIENT RESPITE

## 2017-08-06 PROCEDURE — 74011250637 HC RX REV CODE- 250/637: Performed by: INTERNAL MEDICINE

## 2017-08-06 PROCEDURE — 74011000250 HC RX REV CODE- 250: Performed by: INTERNAL MEDICINE

## 2017-08-06 RX ADMIN — PANTOPRAZOLE SODIUM 40 MG: 40 TABLET, DELAYED RELEASE ORAL at 09:14

## 2017-08-06 RX ADMIN — CLOPIDOGREL BISULFATE 75 MG: 75 TABLET ORAL at 09:13

## 2017-08-06 RX ADMIN — POTASSIUM CHLORIDE 40 MEQ: 20 TABLET, EXTENDED RELEASE ORAL at 09:13

## 2017-08-06 RX ADMIN — SILVER SULFADIAZINE 1 G: 10 CREAM TOPICAL at 17:00

## 2017-08-06 RX ADMIN — BUPROPION HYDROCHLORIDE 100 MG: 100 TABLET ORAL at 22:00

## 2017-08-06 RX ADMIN — BUMETANIDE 1 MG: 1 TABLET ORAL at 09:14

## 2017-08-06 RX ADMIN — AMIODARONE HYDROCHLORIDE 200 MG: 200 TABLET ORAL at 09:14

## 2017-08-06 RX ADMIN — LISINOPRIL 5 MG: 5 TABLET ORAL at 12:51

## 2017-08-06 RX ADMIN — BUMETANIDE 1 MG: 1 TABLET ORAL at 20:00

## 2017-08-06 RX ADMIN — INSULIN GLARGINE 14 UNITS: 100 INJECTION, SOLUTION SUBCUTANEOUS at 09:14

## 2017-08-06 RX ADMIN — HYDRALAZINE HYDROCHLORIDE 50 MG: 50 TABLET, FILM COATED ORAL at 20:00

## 2017-08-06 RX ADMIN — HYDRALAZINE HYDROCHLORIDE 50 MG: 50 TABLET, FILM COATED ORAL at 09:14

## 2017-08-06 RX ADMIN — HYDRALAZINE HYDROCHLORIDE 50 MG: 50 TABLET, FILM COATED ORAL at 13:01

## 2017-08-06 RX ADMIN — ATORVASTATIN CALCIUM 40 MG: 40 TABLET, FILM COATED ORAL at 22:00

## 2017-08-06 RX ADMIN — CARVEDILOL 6.25 MG: 6.25 TABLET ORAL at 20:00

## 2017-08-06 RX ADMIN — CARVEDILOL 6.25 MG: 6.25 TABLET ORAL at 09:14

## 2017-08-06 RX ADMIN — INSULIN GLARGINE 14 UNITS: 100 INJECTION, SOLUTION SUBCUTANEOUS at 20:00

## 2017-08-06 NOTE — PROGRESS NOTES
0700:Report received, pt is sleeping responds to verbal and tactile stimuli, no acute distress or discomfort noted. 0900: Adm am meds  tolerated well, surrounded by family. 1100: Pt watching tv, no complaints. 1300:Pt friend is visiting no complaints. 1500:Pt resting quietly, no discomfort. 1700:Pt sitting up at side of bed eating dinner, pt appetite is good eats 100% of all his meals. 1800: Full bed bath given pt had a L BM, dressing to both legs done due to drainage.   1900:Report given to RN    NAME OF PATIENT:  Bernice Mendez    LEVEL OF CARE:  Respite  REASON FOR GIP: N/A      *PATIENT REMAINS ELIGIBLE FOR GIP LEVEL OF CARE AS EVIDENCED BY: (MUST BE ADDRESSED OF PATIENT GIP)      REASON FOR RESPITE:  Caregiver breakdown    O2 SAFETY:  Concentrator positioning (6\" from furniture/drapes), Tanks stored in mora , No petroleum based products on face while oxygen in use and Oxygen sign on the door    FALL INTERVENTIONS PROVIDED:   Implemented/recommended use of fall risk identification flag to all team members, Implemented/recommended assistive devices and encouraged their use, Implemented/recommended resources for alarm system (personal alarm, bed alarm, call bell, etc.)  and Implemented/recommended environmental changes (remove hazards, lower bed, improve lighting, etc.)    INTERDISPLINARY COMMUNICATION/COLLABORATION:  Physician, MSW, [place] and RN, CNA    NEW MEDICATION INITIATION DOCUMENTATION:N/A      Reason medication is being initiated:  N/A    MD / Provider name consulted re: change in status / initiation of new medication:  N/A    New Symptom(s):  N/A    New Order(s):  N/A    Name of the person notified of the changes:  N/A    Name of person being taught:  N/A    Instructions given:  N/A    Side Effects taught:  N/A    Response to teaching:  N/A      COMFORTABLE DYING MEASURE:  Is Patient/family satisfied with symptom level?  yes    DISCHARGE PLAN:  Pt will complete 5 day respite and go home with family

## 2017-08-06 NOTE — PROGRESS NOTES
1900: Shift report received from Marie Albarran [facility]. Pt is here for respite with dx of end stage heart failure. 2045: Pt in bed, alert and oriented x 4. Pt has had a lot of visitors today, including family from further away, he states to have enjoyed the company. Pt does not complain of any pain or discomfort. Lungs clear and bowel sounds active with last BM on 8-4. Pt has a dressing on both legs from the feet to his knees. Dressing shows some minor breakthrough drainage. Pt states dressing change was done today and he believes his wound nurse is coming tomorrow, but he is not sure. Pt medicated with scheduled meds, blood glucose taken, it is elevated at 308 mg/dl. Pt states he only takes Lantus, even for spikes in blood glucose level. Pt medicated with 14 Units of Lantus per order. Milrinon continuous drip bag changed. One bag remains in refrigerator. 0130: Pt has been sleeping during rounds since 2130. No signs or symptoms of pain or discomfort. 0430: Pt sleeping with no signs of discomfort. 6365: Pt wakes up when RN enters to change battery in CADD pump, pt states no pain or discomfort.     NAME OF PATIENT:  Teodora Mendez    LEVEL OF CARE:  Respite    REASON FOR GIP:   n/a    *PATIENT REMAINS ELIGIBLE FOR GIP LEVEL OF CARE AS EVIDENCED BY: (MUST BE ADDRESSED OF PATIENT GIP)      REASON FOR RESPITE:  Caregiver breakdown    O2 SAFETY:  n/a    FALL INTERVENTIONS PROVIDED:   Implemented/recommended use of fall risk identification flag to all team members, Implemented/recommended assistive devices and encouraged their use, Implemented/recommended resources for alarm system (personal alarm, bed alarm, call bell, etc.) , Implemented/recommended environmental changes (remove hazards, lower bed, improve lighting, etc.) and Implemented/recommended increased supervision/assistance    INTERDISPLINARY COMMUNICATION/COLLABORATION:  Physician, MSW, [place] and RN, CNA    NEW MEDICATION INITIATION DOCUMENTATION:  no meds initiated on this shift    COMFORTABLE DYING MEASURE:  Is Patient/family satisfied with symptom level?  yes    DISCHARGE PLAN:  To go home with his wife on monday

## 2017-08-07 VITALS
RESPIRATION RATE: 20 BRPM | SYSTOLIC BLOOD PRESSURE: 142 MMHG | DIASTOLIC BLOOD PRESSURE: 65 MMHG | HEART RATE: 65 BPM | OXYGEN SATURATION: 98 % | TEMPERATURE: 98 F

## 2017-08-07 PROCEDURE — 0655 HSPC INPATIENT RESPITE

## 2017-08-07 PROCEDURE — 74011250637 HC RX REV CODE- 250/637: Performed by: INTERNAL MEDICINE

## 2017-08-07 PROCEDURE — 0651 HSPC ROUTINE HOME CARE

## 2017-08-07 RX ADMIN — POTASSIUM CHLORIDE 40 MEQ: 20 TABLET, EXTENDED RELEASE ORAL at 09:10

## 2017-08-07 RX ADMIN — BUMETANIDE 1 MG: 1 TABLET ORAL at 09:10

## 2017-08-07 RX ADMIN — HYDRALAZINE HYDROCHLORIDE 50 MG: 50 TABLET, FILM COATED ORAL at 09:11

## 2017-08-07 RX ADMIN — AMIODARONE HYDROCHLORIDE 200 MG: 200 TABLET ORAL at 09:11

## 2017-08-07 RX ADMIN — CARVEDILOL 6.25 MG: 6.25 TABLET ORAL at 09:11

## 2017-08-07 RX ADMIN — CLOPIDOGREL BISULFATE 75 MG: 75 TABLET ORAL at 09:10

## 2017-08-07 RX ADMIN — PANTOPRAZOLE SODIUM 40 MG: 40 TABLET, DELAYED RELEASE ORAL at 09:10

## 2017-08-07 RX ADMIN — INSULIN GLARGINE 14 UNITS: 100 INJECTION, SOLUTION SUBCUTANEOUS at 08:00

## 2017-08-07 NOTE — PROGRESS NOTES
Bedside and Verbal shift change report given to Penny Quach (oncoming nurse) by Charlene Nichols LPN (offgoing nurse). Report included the following information SBAR, Kardex, Intake/Output and MAR. NAME OF PATIENT:  Netta Mendez    LEVEL OF CARE:  RESPITE    REASON FOR GIP:   NA    *PATIENT REMAINS ELIGIBLE FOR GIP LEVEL OF CARE AS EVIDENCED BY: (MUST BE ADDRESSED OF PATIENT GIP)      REASON FOR RESPITE:  Caregiver breakdown    O2 SAFETY:  Concentrator positioning (6\" from furniture/drapes), Tanks stored in mora , No petroleum based products on face while oxygen in use and Oxygen sign on the door    FALL INTERVENTIONS PROVIDED:   Implemented/recommended use of non-skid footwear, Implemented/recommended use of fall risk identification flag to all team members, Implemented/recommended assistive devices and encouraged their use, Implemented/recommended resources for alarm system (personal alarm, bed alarm, call bell, etc.) , Implemented/recommended environmental changes (remove hazards, lower bed, improve lighting, etc.) and Implemented/recommended increased supervision/assistance    INTERDISPLINARY COMMUNICATION/COLLABORATION:  Physician, MSW, [place] and RN, CNA     NEW MEDICATION INITIATION DOCUMENTATION:  NA    Reason medication is being initiated:  PENNY    MD / Provider name consulted re: change in status / initiation of new medication:  NA    New Symptom(s):  NA    New Order(s): NA    Name of the person notified of the changes:  NA    Name of person being taught: NA    Instructions given:  NA    Side Effects taught: NA    Response to teaching: PENNY      COMFORTABLE DYING MEASURE:  Is Patient/family satisfied with symptom level?  yes    DISCHARGE PLAN:  Return home after Respite stay is complete followed by Hospice.

## 2017-08-07 NOTE — PROGRESS NOTES
0700:Verbal shift change report given to SHAYY Stephens (oncoming nurse) by Niyah,RN (offgoing nurse). Report included the following information SBAR, Kardex, Intake/Output and MAR.   0940:[facility] placed to [facility]. Spoke with Delphine Fields, re: discharge. Per Delphine Fields pt will be picked up at [address] [facility]. I also asked Delphine Fields to page his nurse so I could give report to her. 8701: Call placed to wife to let her know when he was leaving [facility], she stated that she would be at home to receive him. 1135:Pt left with all personal belongings home medications and DDNR.  Transported by [facility]    NAME OF PATIENT:  Benjamin Caba    LEVEL OF CARE:  Respite    REASON FOR GIP: NA    *PATIENT REMAINS ELIGIBLE FOR [facility] LEVEL OF CARE AS EVIDENCED BY: (MUST BE ADDRESSED OF PATIENT GIP)    REASON FOR RESPITE:  Caregiver breakdown    O2 SAFETY:NA    FALL INTERVENTIONS PROVIDED:   Implemented/recommended use of non-skid footwear, Implemented/recommended use of fall risk identification flag to all team members, Implemented/recommended assistive devices and encouraged their use, Implemented/recommended resources for alarm system (personal alarm, bed alarm, call bell, etc.) , Implemented/recommended environmental changes (remove hazards, lower bed, improve lighting, etc.) and Implemented/recommended increased supervision/assistance     INTERDISPLINARY COMMUNICATION/COLLABORATION:  Physician, MSW, [place] and RN, CNA    NEW MEDICATION INITIATION DOCUMENTATION:NA    Reason medication is being initiated: NA    MD / Provider name consulted re: change in status / initiation of new medication:  NA    New Symptom(s):  NA    New Order(s):  NA    Name of the person notified of the changes: NA    Name of person being taught: NA    Instructions given:  NA    Side Effects taught:  NA    Response to teaching:  NA      COMFORTABLE DYING MEASURE:  Is Patient/family satisfied with symptom level?  yes    DISCHARGE PLAN:  Home with family today [facility].

## 2018-02-20 ENCOUNTER — HOSPICE ADMISSION (OUTPATIENT)
Dept: HOSPICE | Age: 78
End: 2018-02-20
Payer: OTHER MISCELLANEOUS

## 2018-02-20 ENCOUNTER — HOSPICE ADMISSION (OUTPATIENT)
Dept: HOSPICE | Facility: HOSPICE | Age: 78
End: 2018-02-20

## 2018-02-20 PROCEDURE — 3336500001 HSPC ELECTION

## 2018-02-20 PROCEDURE — 0655 HSPC INPATIENT RESPITE

## 2018-02-21 PROCEDURE — 0655 HSPC INPATIENT RESPITE

## 2018-02-22 PROCEDURE — 0655 HSPC INPATIENT RESPITE

## 2018-02-23 PROCEDURE — 0655 HSPC INPATIENT RESPITE

## 2018-02-24 PROCEDURE — 0655 HSPC INPATIENT RESPITE

## 2018-02-25 PROCEDURE — 0651 HSPC ROUTINE HOME CARE
